# Patient Record
Sex: MALE | Race: BLACK OR AFRICAN AMERICAN | NOT HISPANIC OR LATINO | URBAN - METROPOLITAN AREA
[De-identification: names, ages, dates, MRNs, and addresses within clinical notes are randomized per-mention and may not be internally consistent; named-entity substitution may affect disease eponyms.]

---

## 2017-06-13 ENCOUNTER — FOLLOW UP ESTABLISHED (OUTPATIENT)
Dept: URBAN - METROPOLITAN AREA CLINIC 56 | Facility: CLINIC | Age: 62
End: 2017-06-13
Payer: MEDICARE

## 2017-06-13 DIAGNOSIS — H01.024 SQUAMOUS BLEPHARITIS OF LEFT UPPER LID: ICD-10-CM

## 2017-06-13 DIAGNOSIS — H01.021 SQUAMOUS BLEPHARITIS OF RIGHT UPPER LID: ICD-10-CM

## 2017-06-13 PROCEDURE — 92250 FUNDUS PHOTOGRAPHY W/I&R: CPT | Performed by: OPTOMETRIST

## 2017-06-13 PROCEDURE — 92014 COMPRE OPH EXAM EST PT 1/>: CPT | Performed by: OPTOMETRIST

## 2017-06-13 PROCEDURE — 92015 DETERMINE REFRACTIVE STATE: CPT | Performed by: OPTOMETRIST

## 2017-06-13 RX ORDER — HYPOCHLOROUS ACID/SODIUM CHLOR 0.01 %
0.01 % SPRAY, NON-AEROSOL (ML) TOPICAL
Qty: 80 | Refills: 11 | Status: INACTIVE
Start: 2017-06-13 | End: 2018-03-27

## 2017-06-13 ASSESSMENT — INTRAOCULAR PRESSURE
OD: 13
OS: 12

## 2017-06-13 ASSESSMENT — VISUAL ACUITY
OS: 20/20
OD: 20/20

## 2018-03-27 ENCOUNTER — FOLLOW UP ESTABLISHED (OUTPATIENT)
Dept: URBAN - METROPOLITAN AREA CLINIC 56 | Facility: CLINIC | Age: 63
End: 2018-03-27
Payer: COMMERCIAL

## 2018-03-27 DIAGNOSIS — H01.025 SQUAMOUS BLEPHARITIS OF LEFT LOWER LID: ICD-10-CM

## 2018-03-27 DIAGNOSIS — H01.022 SQUAMOUS BLEPHARITIS OF RIGHT LOWER LID: ICD-10-CM

## 2018-03-27 PROCEDURE — ALC20 AVENOVA LID CLEANSER 20ML: CUSTOM | Performed by: OPTOMETRIST

## 2018-03-27 PROCEDURE — 92015 DETERMINE REFRACTIVE STATE: CPT | Performed by: OPTOMETRIST

## 2018-03-27 PROCEDURE — 92012 INTRM OPH EXAM EST PATIENT: CPT | Performed by: OPTOMETRIST

## 2018-03-27 RX ORDER — HYPOCHLOROUS ACID/SODIUM CHLOR 0.01 %
0.01 % SPRAY, NON-AEROSOL (ML) TOPICAL
Qty: 80 | Refills: 11 | Status: INACTIVE
Start: 2018-03-27 | End: 2018-06-26

## 2018-03-27 ASSESSMENT — VISUAL ACUITY
OD: 20/20
OS: 20/20

## 2018-03-27 ASSESSMENT — INTRAOCULAR PRESSURE
OS: 11
OD: 11

## 2018-06-26 ENCOUNTER — FOLLOW UP ESTABLISHED (OUTPATIENT)
Dept: URBAN - METROPOLITAN AREA CLINIC 56 | Facility: CLINIC | Age: 63
End: 2018-06-26
Payer: COMMERCIAL

## 2018-06-26 DIAGNOSIS — H25.13 AGE-RELATED NUCLEAR CATARACT, BILATERAL: ICD-10-CM

## 2018-06-26 DIAGNOSIS — E11.3293 DIABETES MELLITUS TYPE 2 WITH MILD NON-PROLIFERATI: ICD-10-CM

## 2018-06-26 DIAGNOSIS — H04.123 TEAR FILM INSUFFICIENCY OF BILATERAL LACRIMAL GLANDS: ICD-10-CM

## 2018-06-26 DIAGNOSIS — E11.39 TYPE 2 DIABETES MELLITUS WITH OPHTHALMIC COMPLICAT: Primary | ICD-10-CM

## 2018-06-26 PROCEDURE — 92250 FUNDUS PHOTOGRAPHY W/I&R: CPT | Performed by: OPTOMETRIST

## 2018-06-26 PROCEDURE — 92015 DETERMINE REFRACTIVE STATE: CPT | Performed by: OPTOMETRIST

## 2018-06-26 PROCEDURE — 92014 COMPRE OPH EXAM EST PT 1/>: CPT | Performed by: OPTOMETRIST

## 2018-06-26 RX ORDER — EYELID CLEANSER COMBINATION 5
PADS, MEDICATED (EA) TOPICAL
Qty: 1 | Refills: 3 | Status: INACTIVE
Start: 2018-06-26 | End: 2021-09-10

## 2018-06-26 RX ORDER — CARBOXYMETHYLCELLULOSE SODIUM 10 MG/ML
1 % GEL OPHTHALMIC
Qty: 3 | Refills: 3 | Status: INACTIVE
Start: 2018-06-26 | End: 2018-09-23

## 2018-06-26 RX ORDER — NAPHAZOLINE HCL/GLYCERIN 0.012-0.2%
DROPS OPHTHALMIC (EYE)
Qty: 3 | Refills: 3 | Status: INACTIVE
Start: 2018-06-26 | End: 2018-07-06

## 2018-06-26 ASSESSMENT — KERATOMETRY
OD: 45.03
OS: 45.27

## 2018-06-26 ASSESSMENT — INTRAOCULAR PRESSURE
OS: 13
OD: 12

## 2018-06-26 ASSESSMENT — VISUAL ACUITY
OS: 20/20
OD: 20/20

## 2019-06-28 ENCOUNTER — FOLLOW UP ESTABLISHED (OUTPATIENT)
Dept: URBAN - METROPOLITAN AREA CLINIC 56 | Facility: CLINIC | Age: 64
End: 2019-06-28
Payer: COMMERCIAL

## 2019-06-28 DIAGNOSIS — Z79.4 LONG TERM (CURRENT) USE OF INSULIN: ICD-10-CM

## 2019-06-28 DIAGNOSIS — H52.4 PRESBYOPIA: ICD-10-CM

## 2019-06-28 PROCEDURE — 92014 COMPRE OPH EXAM EST PT 1/>: CPT | Performed by: OPTOMETRIST

## 2019-06-28 PROCEDURE — 92134 CPTRZ OPH DX IMG PST SGM RTA: CPT | Performed by: OPTOMETRIST

## 2019-06-28 PROCEDURE — 92015 DETERMINE REFRACTIVE STATE: CPT | Performed by: OPTOMETRIST

## 2019-06-28 ASSESSMENT — INTRAOCULAR PRESSURE
OS: 12
OD: 12

## 2019-06-28 ASSESSMENT — VISUAL ACUITY
OD: 20/20
OS: 20/20

## 2019-06-28 ASSESSMENT — KERATOMETRY
OS: 45.09
OD: 45.37

## 2020-07-13 ENCOUNTER — FOLLOW UP ESTABLISHED (OUTPATIENT)
Dept: URBAN - METROPOLITAN AREA CLINIC 56 | Facility: CLINIC | Age: 65
End: 2020-07-13
Payer: COMMERCIAL

## 2020-07-13 DIAGNOSIS — E11.9 TYPE 2 DIABETES MELLITUS WITHOUT COMPLICATIONS: Primary | ICD-10-CM

## 2020-07-13 DIAGNOSIS — Z79.84 LONG TERM (CURRENT) USE OF ORAL ANTIDIABETIC DRUGS: ICD-10-CM

## 2020-07-13 DIAGNOSIS — H25.813 COMBINED FORMS OF AGE-RELATED CATARACT, BILATERAL: ICD-10-CM

## 2020-07-13 PROCEDURE — 92250 FUNDUS PHOTOGRAPHY W/I&R: CPT | Performed by: OPTOMETRIST

## 2020-07-13 PROCEDURE — 92015 DETERMINE REFRACTIVE STATE: CPT | Performed by: OPTOMETRIST

## 2020-07-13 PROCEDURE — 92014 COMPRE OPH EXAM EST PT 1/>: CPT | Performed by: OPTOMETRIST

## 2020-07-13 ASSESSMENT — KERATOMETRY
OD: 45.00
OS: 45.09

## 2020-07-13 ASSESSMENT — VISUAL ACUITY
OS: 20/15
OD: 20/15

## 2020-07-13 ASSESSMENT — INTRAOCULAR PRESSURE
OS: 13
OD: 12

## 2021-09-10 ENCOUNTER — OFFICE VISIT (OUTPATIENT)
Dept: URBAN - METROPOLITAN AREA CLINIC 56 | Facility: CLINIC | Age: 66
End: 2021-09-10
Payer: COMMERCIAL

## 2021-09-10 PROCEDURE — 92014 COMPRE OPH EXAM EST PT 1/>: CPT | Performed by: STUDENT IN AN ORGANIZED HEALTH CARE EDUCATION/TRAINING PROGRAM

## 2021-09-10 PROCEDURE — 92250 FUNDUS PHOTOGRAPHY W/I&R: CPT | Performed by: STUDENT IN AN ORGANIZED HEALTH CARE EDUCATION/TRAINING PROGRAM

## 2021-09-10 PROCEDURE — 92134 CPTRZ OPH DX IMG PST SGM RTA: CPT | Performed by: STUDENT IN AN ORGANIZED HEALTH CARE EDUCATION/TRAINING PROGRAM

## 2021-09-10 ASSESSMENT — KERATOMETRY
OD: 45.09
OS: 45.06

## 2021-09-10 ASSESSMENT — INTRAOCULAR PRESSURE
OD: 12
OS: 10

## 2021-09-10 ASSESSMENT — VISUAL ACUITY
OS: 20/20
OD: 20/20

## 2021-09-10 NOTE — IMPRESSION/PLAN
Impression: Type 2 diabetes mellitus without complications: F37.6. Plan: Patient educated on condition, importance of diet, exercise, and regular follow ups with PCP. No NPDR or DME present, good blood sugar control continue to monitor with annual DM DFE.

## 2022-09-26 ENCOUNTER — OFFICE VISIT (OUTPATIENT)
Dept: URBAN - METROPOLITAN AREA CLINIC 56 | Facility: CLINIC | Age: 67
End: 2022-09-26
Payer: COMMERCIAL

## 2022-09-26 DIAGNOSIS — Z79.84 LONG TERM (CURRENT) USE OF ORAL ANTIDIABETIC DRUGS: ICD-10-CM

## 2022-09-26 DIAGNOSIS — H25.813 COMBINED FORMS OF AGE-RELATED CATARACT, BILATERAL: ICD-10-CM

## 2022-09-26 DIAGNOSIS — H52.4 PRESBYOPIA: ICD-10-CM

## 2022-09-26 DIAGNOSIS — E11.9 TYPE 2 DIABETES MELLITUS WITHOUT COMPLICATIONS: Primary | ICD-10-CM

## 2022-09-26 PROCEDURE — 92014 COMPRE OPH EXAM EST PT 1/>: CPT | Performed by: OPTOMETRIST

## 2022-09-26 ASSESSMENT — VISUAL ACUITY
OD: 20/20
OS: 20/20

## 2022-09-26 ASSESSMENT — KERATOMETRY
OS: 44.85
OD: 45.03

## 2022-09-26 ASSESSMENT — INTRAOCULAR PRESSURE
OS: 10
OD: 11

## 2022-09-26 NOTE — IMPRESSION/PLAN
Impression: Type 2 diabetes mellitus without complications: X69.2. Optos ordered today, no retinopathy. Plan: No diabetic retinopathy. Recommend yearly diabetic eye exam. Discussed with patient importance of good blood sugar control.

## 2024-10-04 ENCOUNTER — OFFICE VISIT (OUTPATIENT)
Dept: URBAN - METROPOLITAN AREA CLINIC 56 | Facility: LOCATION | Age: 69
End: 2024-10-04
Payer: COMMERCIAL

## 2024-10-04 DIAGNOSIS — Z79.84 LONG TERM (CURRENT) USE OF ORAL ANTIDIABETIC DRUGS: ICD-10-CM

## 2024-10-04 DIAGNOSIS — H52.4 PRESBYOPIA: ICD-10-CM

## 2024-10-04 DIAGNOSIS — H25.813 COMBINED FORMS OF AGE-RELATED CATARACT, BILATERAL: ICD-10-CM

## 2024-10-04 DIAGNOSIS — E11.9 TYPE 2 DIABETES MELLITUS WITHOUT COMPLICATIONS: Primary | ICD-10-CM

## 2024-10-04 PROCEDURE — 92014 COMPRE OPH EXAM EST PT 1/>: CPT | Performed by: OPTOMETRIST

## 2024-10-04 ASSESSMENT — INTRAOCULAR PRESSURE
OS: 9
OD: 6

## 2024-10-04 ASSESSMENT — KERATOMETRY
OD: 45.24
OS: 45.03

## 2024-10-04 ASSESSMENT — VISUAL ACUITY
OS: 20/25
OD: 20/25